# Patient Record
Sex: MALE | Race: OTHER | HISPANIC OR LATINO | URBAN - METROPOLITAN AREA
[De-identification: names, ages, dates, MRNs, and addresses within clinical notes are randomized per-mention and may not be internally consistent; named-entity substitution may affect disease eponyms.]

---

## 2020-01-17 ENCOUNTER — EMERGENCY (EMERGENCY)
Facility: HOSPITAL | Age: 30
LOS: 1 days | Discharge: HOME | End: 2020-01-17
Admitting: EMERGENCY MEDICINE
Payer: SELF-PAY

## 2020-01-17 VITALS
DIASTOLIC BLOOD PRESSURE: 72 MMHG | RESPIRATION RATE: 18 BRPM | HEIGHT: 71 IN | SYSTOLIC BLOOD PRESSURE: 120 MMHG | TEMPERATURE: 99 F | HEART RATE: 95 BPM | WEIGHT: 274.92 LBS | OXYGEN SATURATION: 97 %

## 2020-01-17 PROCEDURE — 71046 X-RAY EXAM CHEST 2 VIEWS: CPT | Mod: 26

## 2020-01-17 PROCEDURE — 94640 AIRWAY INHALATION TREATMENT: CPT

## 2020-01-17 PROCEDURE — 99284 EMERGENCY DEPT VISIT MOD MDM: CPT | Mod: 25

## 2020-01-17 PROCEDURE — 71046 X-RAY EXAM CHEST 2 VIEWS: CPT

## 2020-01-17 PROCEDURE — 99284 EMERGENCY DEPT VISIT MOD MDM: CPT

## 2020-01-17 RX ORDER — ALBUTEROL 90 UG/1
2 AEROSOL, METERED ORAL
Qty: 8 | Refills: 0
Start: 2020-01-17 | End: 2020-02-15

## 2020-01-17 RX ORDER — AZITHROMYCIN 500 MG/1
1 TABLET, FILM COATED ORAL
Qty: 6 | Refills: 0
Start: 2020-01-17

## 2020-01-17 RX ORDER — PROMETHAZINE HCL/CODEINE
5 SYRUP ORAL
Qty: 75 | Refills: 0
Start: 2020-01-17

## 2020-01-17 RX ORDER — IPRATROPIUM/ALBUTEROL SULFATE 18-103MCG
3 AEROSOL WITH ADAPTER (GRAM) INHALATION ONCE
Refills: 0 | Status: COMPLETED | OUTPATIENT
Start: 2020-01-17 | End: 2020-01-17

## 2020-01-17 RX ADMIN — Medication 3 MILLILITER(S): at 18:52

## 2020-01-17 NOTE — ED ADULT TRIAGE NOTE - CHIEF COMPLAINT QUOTE
Patient c/o cough and rt ear feels clogged for 1 month . History of asthma . Denies any fever nor chills .

## 2020-01-17 NOTE — ED ADULT NURSE NOTE - CHPI ED NUR SYMPTOMS NEG
no shortness of breath/no body aches/no chills/no edema/no headache/no hemoptysis/no fever/no diaphoresis/no wheezing

## 2020-01-17 NOTE — ED PROVIDER NOTE - PATIENT PORTAL LINK FT
You can access the FollowMyHealth Patient Portal offered by [facility] by registering at the following website: [email or web address]. By joining [employer]’s FollowMyHealth portal, you will also be able to view your health information using other applications (apps) compatible with our system.

## 2020-01-17 NOTE — ED PROVIDER NOTE - CLINICAL SUMMARY MEDICAL DECISION MAKING FREE TEXT BOX
28 y/o m hx asthma presents c/o persistent cough x 3 weeks; pt afebrile, coughing throughout visit, cxr shows ?small retrocardiac infiltrate, will treat with zpack, given promethazine with codeine, albuterol inhaler.  Encouraged rest, oral hydration, f/u pmd

## 2020-01-17 NOTE — ED PROVIDER NOTE - NSFOLLOWUPINSTRUCTIONS_ED_ALL_ED_FT
Upper Respiratory Infection    WHAT YOU NEED TO KNOW:    An upper respiratory infection is also called the common cold. It is an infection that can affect your nose, throat, ears, and sinuses. For healthy people, the common cold is usually not serious and does not need special treatment. Cold symptoms are usually worst for the first 3 to 5 days. Most people get better in 7 to 14 days. You may continue to cough for 2 to 3 weeks. Colds are caused by viruses and do not get better with antibiotics.     DISCHARGE INSTRUCTIONS:    Return to the emergency department if:     You have chest pain or trouble breathing.        Contact your healthcare provider if:     You have a fever over 102ºF (39°C).      Your sore throat gets worse or you see white or yellow spots in your throat.      Your symptoms get worse after 3 to 5 days or your cold is not better in 14 days.      You have a rash anywhere on your skin.      You have large, tender lumps in your neck.      You have thick, green or yellow drainage from your nose.      You cough up thick yellow, green, or bloody mucus.      You have vomiting for more than 24 hours and cannot keep fluids down.      You have a bad earache.      You have questions or concerns about your condition or care.    Medicines: You may need any of the following:     Decongestants help reduce nasal congestion and help you breathe more easily. If you take decongestant pills, they may make you feel restless or cause problems with your sleep. Do not use decongestant sprays for more than a few days.      Cough suppressants help reduce coughing. Ask your healthcare provider which type of cough medicine is best for you.       NSAIDs, such as ibuprofen, help decrease swelling, pain, and fever. NSAIDs can cause stomach bleeding or kidney problems in certain people. If you take blood thinner medicine, always ask your healthcare provider if NSAIDs are safe for you. Always read the medicine label and follow directions.      Acetaminophen decreases pain and fever. It is available without a doctor's order. Ask how much to take and how often to take it. Follow directions. Read the labels of all other medicines you are using to see if they also contain acetaminophen, or ask your doctor or pharmacist. Acetaminophen can cause liver damage if not taken correctly. Do not use more than 4 grams (4,000 milligrams) total of acetaminophen in one day.       Take your medicine as directed. Contact your healthcare provider if you think your medicine is not helping or if you have side effects. Tell him or her if you are allergic to any medicine. Keep a list of the medicines, vitamins, and herbs you take. Include the amounts, and when and why you take them. Bring the list or the pill bottles to follow-up visits. Carry your medicine list with you in case of an emergency.    Follow up with your healthcare provider as directed: Write down your questions so you remember to ask them during your visits.     Self-care:     Rest as much as possible. Slowly start to do more each day.       Drink more liquids as directed. Liquids will help thin and loosen mucus so you can cough it up. Liquids will also help prevent dehydration. Liquids that help prevent dehydration include water, fruit juice, and broth. Do not drink liquids that contain caffeine. Caffeine can increase your risk for dehydration. Ask your healthcare provider how much liquid to drink each day.       Soothe a sore throat. Gargle with warm salt water. This helps your sore throat feel better. Make salt water by dissolving ¼ teaspoon salt in 1 cup warm water. You may also suck on hard candy or throat lozenges. You may use a sore throat spray.      Use a humidifier or vaporizer. Use a cool mist humidifier or a vaporizer to increase air moisture in your home. This may make it easier for you to breathe and help decrease your cough.       Use saline nasal drops as directed. These help relieve congestion.       Apply petroleum-based jelly around the outside of your nostrils. This can decrease irritation from blowing your nose.       Do not smoke. Nicotine and other chemicals in cigarettes and cigars can make your symptoms worse. They can also cause infections such as bronchitis or pneumonia. Ask your healthcare provider for information if you currently smoke and need help to quit. E-cigarettes or smokeless tobacco still contain nicotine. Talk to your healthcare provider before you use these products.     Prevent spreading your cold to others:     Try to stay away from other people during the first 2 to 3 days of your cold when it is more easily spread.       Do not share food or drinks.       Do not share hand towels with household members.      Wash your hands often, especially after you blow your nose. Turn away from other people and cover your mouth and nose with a tissue when you sneeze or cough.

## 2020-01-17 NOTE — ED PROVIDER NOTE - OBJECTIVE STATEMENT
30 y/o m hx childhood asthma presents c/o cough x 3 weeks.  Pt reports cough is dry, but persistent, worse at night and getting less sleep as a result.  Pt taking OTC cough syrup with no improvement.  Denies CP, fever, chills, n/v/d, all other ROS negative.

## 2020-01-17 NOTE — ED ADULT NURSE NOTE - NSIMPLEMENTINTERV_GEN_ALL_ED
Implemented All Universal Safety Interventions:  [place] to call system. Call bell, personal items and telephone within reach. Instruct patient to call for assistance. Room bathroom lighting operational. Non-slip footwear when patient is off stretcher. Physically safe environment: no spills, clutter or unnecessary equipment. Stretcher in lowest position, wheels locked, appropriate side rails in place.

## 2020-01-17 NOTE — ED ADULT NURSE NOTE - OBJECTIVE STATEMENT
pt received sitting in chair, A&O x 3. hx asthma. pt arrived to ED with c/c non-productive cough for approximately 1 month. Cough is dry. pt denies fever, chills, n/v/d. Endorses associated chest wall pain with cough. NAD noted at this time, will continue to monitor.

## 2020-01-23 DIAGNOSIS — R05 COUGH: ICD-10-CM

## 2022-07-15 ENCOUNTER — RX ONLY (RX ONLY)
Age: 32
End: 2022-07-15

## 2022-07-15 ENCOUNTER — OFFICE (OUTPATIENT)
Dept: URBAN - METROPOLITAN AREA CLINIC 76 | Facility: CLINIC | Age: 32
Setting detail: OPHTHALMOLOGY
End: 2022-07-15
Payer: COMMERCIAL

## 2022-07-15 DIAGNOSIS — H40.013: ICD-10-CM

## 2022-07-15 DIAGNOSIS — H18.831: ICD-10-CM

## 2022-07-15 DIAGNOSIS — H35.423: ICD-10-CM

## 2022-07-15 DIAGNOSIS — H04.123: ICD-10-CM

## 2022-07-15 PROCEDURE — 92004 COMPRE OPH EXAM NEW PT 1/>: CPT | Performed by: OPHTHALMOLOGY

## 2022-07-15 ASSESSMENT — REFRACTION_CURRENTRX
OD_VPRISM_DIRECTION: SV
OS_OVR_VA: 20/
OS_SPHERE: -0.75
OD_AXIS: 144
OS_AXIS: 20
OD_SPHERE: -0.75
OS_CYLINDER: -1.50
OS_VPRISM_DIRECTION: SV
OD_OVR_VA: 20/
OD_CYLINDER: -1.25

## 2022-07-15 ASSESSMENT — VISUAL ACUITY
OD_BCVA: 20/30
OS_BCVA: 20/30

## 2022-07-15 ASSESSMENT — AXIALLENGTH_DERIVED
OD_AL: 24.8989
OS_AL: 24.7402
OD_AL: 24.9532
OS_AL: 24.8478

## 2022-07-15 ASSESSMENT — REFRACTION_AUTOREFRACTION
OS_CYLINDER: -2.50
OS_AXIS: 30
OD_SPHERE: -1.25
OD_CYLINDER: -1.75
OS_SPHERE: -0.75
OD_AXIS: 144

## 2022-07-15 ASSESSMENT — REFRACTION_MANIFEST
OS_SPHERE: -0.75
OS_CYLINDER: -2.00
OD_AXIS: 150
OS_VA1: 20/25
OD_VA1: 20/25
OS_AXIS: 30
OD_CYLINDER: -1.50
OD_SPHERE: -1.25

## 2022-07-15 ASSESSMENT — KERATOMETRY
OS_K1POWER_DIOPTERS: 41.25
OD_K2POWER_DIOPTERS: 43.00
OD_K1POWER_DIOPTERS: 41.50
OS_AXISANGLE_DEGREES: 118
OS_K2POWER_DIOPTERS: 43.50
OD_AXISANGLE_DEGREES: 63

## 2022-07-15 ASSESSMENT — SPHEQUIV_DERIVED
OS_SPHEQUIV: -1.75
OS_SPHEQUIV: -2
OD_SPHEQUIV: -2
OD_SPHEQUIV: -2.125

## 2022-07-15 ASSESSMENT — TONOMETRY: OS_IOP_MMHG: 20

## 2022-07-15 ASSESSMENT — CONFRONTATIONAL VISUAL FIELD TEST (CVF)
OS_FINDINGS: FULL
OD_FINDINGS: FULL

## 2022-07-15 ASSESSMENT — SUPERFICIAL PUNCTATE KERATITIS (SPK): OS_SPK: T

## 2022-08-19 ENCOUNTER — OFFICE (OUTPATIENT)
Dept: URBAN - METROPOLITAN AREA CLINIC 76 | Facility: CLINIC | Age: 32
Setting detail: OPHTHALMOLOGY
End: 2022-08-19

## 2022-08-19 PROCEDURE — 33333 MEDICAL RECORDS FEE: CPT | Performed by: OPHTHALMOLOGY

## 2022-09-16 ENCOUNTER — OFFICE (OUTPATIENT)
Dept: URBAN - METROPOLITAN AREA CLINIC 76 | Facility: CLINIC | Age: 32
Setting detail: OPHTHALMOLOGY
End: 2022-09-16
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
## 2022-09-16 PROCEDURE — NO SHOW FE NO SHOW FEE: Performed by: OPHTHALMOLOGY

## 2022-10-31 ENCOUNTER — OFFICE (OUTPATIENT)
Dept: URBAN - METROPOLITAN AREA CLINIC 76 | Facility: CLINIC | Age: 32
Setting detail: OPHTHALMOLOGY
End: 2022-10-31
Payer: COMMERCIAL

## 2022-10-31 DIAGNOSIS — H16.222: ICD-10-CM

## 2022-10-31 DIAGNOSIS — H18.831: ICD-10-CM

## 2022-10-31 DIAGNOSIS — H40.013: ICD-10-CM

## 2022-10-31 DIAGNOSIS — H35.423: ICD-10-CM

## 2022-10-31 PROCEDURE — 99213 OFFICE O/P EST LOW 20 MIN: CPT | Performed by: OPHTHALMOLOGY

## 2022-10-31 PROCEDURE — 92133 CPTRZD OPH DX IMG PST SGM ON: CPT | Performed by: OPHTHALMOLOGY

## 2022-10-31 PROCEDURE — 92083 EXTENDED VISUAL FIELD XM: CPT | Performed by: OPHTHALMOLOGY

## 2022-10-31 ASSESSMENT — TONOMETRY
OS_IOP_MMHG: 20
OD_IOP_MMHG: 20

## 2022-10-31 ASSESSMENT — VISUAL ACUITY
OS_BCVA: 20/20-2
OD_BCVA: 20/20-2

## 2022-10-31 ASSESSMENT — REFRACTION_CURRENTRX
OD_AXIS: 144
OS_SPHERE: -0.75
OD_SPHERE: -0.75
OD_OVR_VA: 20/
OS_CYLINDER: -1.50
OD_CYLINDER: -1.25
OS_OVR_VA: 20/
OS_AXIS: 20
OS_VPRISM_DIRECTION: SV
OD_VPRISM_DIRECTION: SV

## 2022-10-31 ASSESSMENT — SPHEQUIV_DERIVED
OD_SPHEQUIV: -2
OS_SPHEQUIV: -1.75
OS_SPHEQUIV: -1.875
OD_SPHEQUIV: -2

## 2022-10-31 ASSESSMENT — REFRACTION_AUTOREFRACTION
OD_CYLINDER: -2.00
OD_AXIS: 146
OS_CYLINDER: -2.75
OS_SPHERE: -0.50
OD_SPHERE: -1.00
OS_AXIS: 27

## 2022-10-31 ASSESSMENT — CONFRONTATIONAL VISUAL FIELD TEST (CVF)
OS_FINDINGS: FULL
OD_FINDINGS: FULL

## 2022-10-31 ASSESSMENT — REFRACTION_MANIFEST
OS_VA1: 20/25
OS_SPHERE: -0.75
OS_AXIS: 30
OD_CYLINDER: -1.50
OS_CYLINDER: -2.00
OD_SPHERE: -1.25
OD_VA1: 20/25
OD_AXIS: 150

## 2022-10-31 ASSESSMENT — SUPERFICIAL PUNCTATE KERATITIS (SPK): OS_SPK: T

## 2024-12-30 ENCOUNTER — OFFICE (OUTPATIENT)
Facility: LOCATION | Age: 34
Setting detail: OPHTHALMOLOGY
End: 2024-12-30
Payer: COMMERCIAL

## 2024-12-30 DIAGNOSIS — H40.013: ICD-10-CM

## 2024-12-30 DIAGNOSIS — H16.223: ICD-10-CM

## 2024-12-30 DIAGNOSIS — H43.393: ICD-10-CM

## 2024-12-30 PROCEDURE — LEGACY BIL LEGACY BILLING: Performed by: OPHTHALMOLOGY

## 2024-12-30 ASSESSMENT — REFRACTION_AUTOREFRACTION
OD_SPHERE: -1.50
OS_SPHERE: -1.00
OD_CYLINDER: -1.75
OS_CYLINDER: -2.50
OD_AXIS: 150
OS_AXIS: 025

## 2024-12-30 ASSESSMENT — REFRACTION_CURRENTRX
OD_CYLINDER: -1.50
OD_AXIS: 151
OS_OVR_VA: 20/
OD_VPRISM_DIRECTION: SV
OS_AXIS: 026
OD_OVR_VA: 20/
OD_SPHERE: -1.25
OS_CYLINDER: -2.00
OS_VPRISM_DIRECTION: SV
OS_SPHERE: -0.75

## 2024-12-30 ASSESSMENT — REFRACTION_MANIFEST
OS_SPHERE: -1.00
OS_CYLINDER: -2.00
OD_VA1: 20/20 -1
OD_AXIS: 150
OD_SPHERE: -1.50
OS_CYLINDER: -2.50
OS_SPHERE: -1.00
OS_AXIS: 025
OD_AXIS: 150
OS_VA1: 20/20 -1
OD_CYLINDER: -1.50
OD_VA1: 20/20 -1
OD_CYLINDER: -1.75
OS_VA1: 20/20 -1
OS_AXIS: 025
OD_SPHERE: -1.50

## 2024-12-30 ASSESSMENT — KERATOMETRY
OS_K1POWER_DIOPTERS: 41.25
OS_AXISANGLE_DEGREES: 117
OD_K2POWER_DIOPTERS: 42.75
OD_K1POWER_DIOPTERS: 41.25
METHOD_AUTO_MANUAL: AUTO
OS_K2POWER_DIOPTERS: 43.50
OD_AXISANGLE_DEGREES: 061

## 2024-12-30 ASSESSMENT — VISUAL ACUITY
OD_BCVA: 20/20
OS_BCVA: 20/20

## 2024-12-30 ASSESSMENT — TONOMETRY
OD_IOP_MMHG: 17
OS_IOP_MMHG: 18

## 2024-12-30 ASSESSMENT — DECREASING TEAR LAKE - SEVERITY SCORE
OD_DEC_TEARLAKE: 1+
OS_DEC_TEARLAKE: 1+